# Patient Record
Sex: FEMALE | Race: WHITE | NOT HISPANIC OR LATINO | Employment: STUDENT | URBAN - METROPOLITAN AREA
[De-identification: names, ages, dates, MRNs, and addresses within clinical notes are randomized per-mention and may not be internally consistent; named-entity substitution may affect disease eponyms.]

---

## 2023-04-17 ENCOUNTER — APPOINTMENT (OUTPATIENT)
Dept: PRIMARY CARE | Facility: CLINIC | Age: 23
End: 2023-04-17
Payer: COMMERCIAL

## 2023-04-17 ENCOUNTER — OFFICE VISIT (OUTPATIENT)
Dept: PRIMARY CARE | Facility: CLINIC | Age: 23
End: 2023-04-17
Payer: COMMERCIAL

## 2023-04-17 ENCOUNTER — LAB (OUTPATIENT)
Dept: LAB | Facility: LAB | Age: 23
End: 2023-04-17
Payer: COMMERCIAL

## 2023-04-17 VITALS
DIASTOLIC BLOOD PRESSURE: 74 MMHG | RESPIRATION RATE: 16 BRPM | WEIGHT: 170 LBS | TEMPERATURE: 97.6 F | BODY MASS INDEX: 26.68 KG/M2 | HEART RATE: 70 BPM | HEIGHT: 67 IN | SYSTOLIC BLOOD PRESSURE: 118 MMHG

## 2023-04-17 DIAGNOSIS — Z00.00 ANNUAL PHYSICAL EXAM: ICD-10-CM

## 2023-04-17 DIAGNOSIS — Z00.00 ANNUAL PHYSICAL EXAM: Primary | ICD-10-CM

## 2023-04-17 DIAGNOSIS — K90.41 GLUTEN INTOLERANCE: ICD-10-CM

## 2023-04-17 DIAGNOSIS — E55.9 VITAMIN D INSUFFICIENCY: ICD-10-CM

## 2023-04-17 LAB
ALANINE AMINOTRANSFERASE (SGPT) (U/L) IN SER/PLAS: 15 U/L (ref 7–45)
ALBUMIN (G/DL) IN SER/PLAS: 3.9 G/DL (ref 3.4–5)
ALKALINE PHOSPHATASE (U/L) IN SER/PLAS: 47 U/L (ref 33–110)
ANION GAP IN SER/PLAS: 8 MMOL/L (ref 10–20)
ASPARTATE AMINOTRANSFERASE (SGOT) (U/L) IN SER/PLAS: 16 U/L (ref 9–39)
BASOPHILS (10*3/UL) IN BLOOD BY AUTOMATED COUNT: 0.04 X10E9/L (ref 0–0.1)
BASOPHILS/100 LEUKOCYTES IN BLOOD BY AUTOMATED COUNT: 0.7 % (ref 0–2)
BILIRUBIN TOTAL (MG/DL) IN SER/PLAS: 0.4 MG/DL (ref 0–1.2)
CALCIDIOL (25 OH VITAMIN D3) (NG/ML) IN SER/PLAS: 39 NG/ML
CALCIUM (MG/DL) IN SER/PLAS: 9.2 MG/DL (ref 8.6–10.3)
CARBON DIOXIDE, TOTAL (MMOL/L) IN SER/PLAS: 26 MMOL/L (ref 21–32)
CHLORIDE (MMOL/L) IN SER/PLAS: 105 MMOL/L (ref 98–107)
CHOLESTEROL (MG/DL) IN SER/PLAS: 149 MG/DL (ref 0–199)
CHOLESTEROL IN HDL (MG/DL) IN SER/PLAS: 59.1 MG/DL
CHOLESTEROL/HDL RATIO: 2.5
CREATININE (MG/DL) IN SER/PLAS: 0.77 MG/DL (ref 0.5–1.05)
EOSINOPHILS (10*3/UL) IN BLOOD BY AUTOMATED COUNT: 0.08 X10E9/L (ref 0–0.7)
EOSINOPHILS/100 LEUKOCYTES IN BLOOD BY AUTOMATED COUNT: 1.4 % (ref 0–6)
ERYTHROCYTE DISTRIBUTION WIDTH (RATIO) BY AUTOMATED COUNT: 12.9 % (ref 11.5–14.5)
ERYTHROCYTE MEAN CORPUSCULAR HEMOGLOBIN CONCENTRATION (G/DL) BY AUTOMATED: 32.4 G/DL (ref 32–36)
ERYTHROCYTE MEAN CORPUSCULAR VOLUME (FL) BY AUTOMATED COUNT: 88 FL (ref 80–100)
ERYTHROCYTES (10*6/UL) IN BLOOD BY AUTOMATED COUNT: 4.64 X10E12/L (ref 4–5.2)
GFR FEMALE: >90 ML/MIN/1.73M2
GLUCOSE (MG/DL) IN SER/PLAS: 84 MG/DL (ref 74–99)
HEMATOCRIT (%) IN BLOOD BY AUTOMATED COUNT: 41 % (ref 36–46)
HEMOGLOBIN (G/DL) IN BLOOD: 13.3 G/DL (ref 12–16)
IMMATURE GRANULOCYTES/100 LEUKOCYTES IN BLOOD BY AUTOMATED COUNT: 0.9 % (ref 0–0.9)
LDL: 68 MG/DL (ref 0–119)
LEUKOCYTES (10*3/UL) IN BLOOD BY AUTOMATED COUNT: 5.5 X10E9/L (ref 4.4–11.3)
LYMPHOCYTES (10*3/UL) IN BLOOD BY AUTOMATED COUNT: 1.72 X10E9/L (ref 1.2–4.8)
LYMPHOCYTES/100 LEUKOCYTES IN BLOOD BY AUTOMATED COUNT: 31.2 % (ref 13–44)
MONOCYTES (10*3/UL) IN BLOOD BY AUTOMATED COUNT: 0.37 X10E9/L (ref 0.1–1)
MONOCYTES/100 LEUKOCYTES IN BLOOD BY AUTOMATED COUNT: 6.7 % (ref 2–10)
NEUTROPHILS (10*3/UL) IN BLOOD BY AUTOMATED COUNT: 3.26 X10E9/L (ref 1.2–7.7)
NEUTROPHILS/100 LEUKOCYTES IN BLOOD BY AUTOMATED COUNT: 59.1 % (ref 40–80)
PLATELETS (10*3/UL) IN BLOOD AUTOMATED COUNT: 242 X10E9/L (ref 150–450)
POTASSIUM (MMOL/L) IN SER/PLAS: 4.1 MMOL/L (ref 3.5–5.3)
PROTEIN TOTAL: 6.6 G/DL (ref 6.4–8.2)
SODIUM (MMOL/L) IN SER/PLAS: 135 MMOL/L (ref 136–145)
TRIGLYCERIDE (MG/DL) IN SER/PLAS: 112 MG/DL (ref 0–149)
UREA NITROGEN (MG/DL) IN SER/PLAS: 7 MG/DL (ref 6–23)
VLDL: 22 MG/DL (ref 0–40)

## 2023-04-17 PROCEDURE — 80053 COMPREHEN METABOLIC PANEL: CPT

## 2023-04-17 PROCEDURE — 36415 COLL VENOUS BLD VENIPUNCTURE: CPT

## 2023-04-17 PROCEDURE — 99395 PREV VISIT EST AGE 18-39: CPT | Performed by: NURSE PRACTITIONER

## 2023-04-17 PROCEDURE — 82306 VITAMIN D 25 HYDROXY: CPT

## 2023-04-17 PROCEDURE — 80061 LIPID PANEL: CPT

## 2023-04-17 PROCEDURE — 1036F TOBACCO NON-USER: CPT | Performed by: NURSE PRACTITIONER

## 2023-04-17 PROCEDURE — 85025 COMPLETE CBC W/AUTO DIFF WBC: CPT

## 2023-04-17 RX ORDER — BUSPIRONE HYDROCHLORIDE 5 MG/1
5 TABLET ORAL
COMMUNITY
Start: 2021-03-29 | End: 2024-06-03 | Stop reason: WASHOUT

## 2023-04-17 RX ORDER — NORGESTIMATE AND ETHINYL ESTRADIOL 0.25-0.035
1 KIT ORAL DAILY
COMMUNITY
Start: 2020-03-03

## 2023-04-17 RX ORDER — SUMATRIPTAN 50 MG/1
50 TABLET, FILM COATED ORAL ONCE AS NEEDED
COMMUNITY
Start: 2021-06-03 | End: 2024-06-03 | Stop reason: WASHOUT

## 2023-04-17 RX ORDER — ESCITALOPRAM OXALATE 20 MG/1
1 TABLET ORAL DAILY
COMMUNITY
Start: 2020-12-14 | End: 2023-08-28

## 2023-04-17 RX ORDER — HYDROXYZINE PAMOATE 25 MG/1
25 CAPSULE ORAL
COMMUNITY
Start: 2020-09-02 | End: 2024-06-03 | Stop reason: WASHOUT

## 2023-04-17 RX ORDER — BUPROPION HYDROCHLORIDE 150 MG/1
1 TABLET ORAL DAILY
COMMUNITY
End: 2023-08-28

## 2023-04-17 RX ORDER — ONDANSETRON 4 MG/1
4 TABLET, FILM COATED ORAL EVERY 8 HOURS PRN
Qty: 14 TABLET | Refills: 1 | Status: SHIPPED | OUTPATIENT
Start: 2023-04-17

## 2023-04-17 NOTE — PROGRESS NOTES
"Subjective   Patient ID: Trini Cadena is a 23 y.o. female who presents for Med Refill (Patient here for med refills and possible BW. Last appointment was a year ago. ).    Doing well overall  Appointment for eye exam next month, just saw dentist  Upcoming gyn visit - no concerns  Mood is well managed with current regimen.  Headaches well managed with current regimen.  Tries to keep fluids replenished         Review of Systems   Constitutional: Negative.    HENT: Negative.     Respiratory:  Negative for cough.    Cardiovascular: Negative.    Gastrointestinal:  Negative for abdominal pain and blood in stool.   Genitourinary: Negative.    Musculoskeletal:  Negative for arthralgias and myalgias.   Psychiatric/Behavioral: Negative.         Objective   /74   Pulse 70   Temp 36.4 °C (97.6 °F)   Resp 16   Ht 1.702 m (5' 7\")   Wt 77.1 kg (170 lb)   BMI 26.63 kg/m²     Physical Exam  Vitals reviewed.   Constitutional:       Appearance: Normal appearance. She is not ill-appearing.   HENT:      Head: Normocephalic and atraumatic.   Eyes:      Extraocular Movements: Extraocular movements intact.   Cardiovascular:      Rate and Rhythm: Normal rate and regular rhythm.   Abdominal:      General: Abdomen is flat.      Palpations: Abdomen is soft. There is no mass.      Tenderness: There is no abdominal tenderness. There is no right CVA tenderness or left CVA tenderness.      Comments: Intermittent GI issues, not today - bloating and intolerance likely of gluten.   Typically sx are managed with avoidance.   Musculoskeletal:         General: Normal range of motion.      Cervical back: Normal range of motion. No tenderness.   Skin:     General: Skin is warm and dry.      Capillary Refill: Capillary refill takes less than 2 seconds.   Neurological:      General: No focal deficit present.      Mental Status: She is alert.   Psychiatric:         Mood and Affect: Mood normal.         Assessment/Plan   Diagnoses and all " orders for this visit:  Annual physical exam  Vitamin D insufficiency  Gluten intolerance

## 2023-04-19 ENCOUNTER — TELEPHONE (OUTPATIENT)
Dept: PRIMARY CARE | Facility: CLINIC | Age: 23
End: 2023-04-19
Payer: COMMERCIAL

## 2023-04-19 NOTE — TELEPHONE ENCOUNTER
----- Message from JAYJAY Acosta sent at 4/19/2023  9:11 AM EDT -----  Regarding: labs  Please let Trini know labs are really unremarkable other than vitamin d is low as we discussed.  Ideal vitamin D is 50-60, so add 1-2000 international units daily of OTC vitamin D3.  Cholesterols are in good balance.     Encourage her to sign up for [employer] so I can send messages directly.       ----- Message -----  From: Lab, Background User  Sent: 4/17/2023   3:31 PM EDT  To: JAYJAY Acosta

## 2023-04-27 ENCOUNTER — TELEPHONE (OUTPATIENT)
Dept: PRIMARY CARE | Facility: CLINIC | Age: 23
End: 2023-04-27
Payer: COMMERCIAL

## 2023-04-27 NOTE — TELEPHONE ENCOUNTER
----- Message from Gretta Cazares CMA sent at 4/19/2023 10:40 AM EDT -----  Regarding: FW: labs  Unable to leave a message at this time   ----- Message -----  From: JAYJAY Acosta  Sent: 4/19/2023   9:14 AM EDT  To: Gretta Cazares CMA  Subject: labs                                             Please let Trini know labs are really unremarkable other than vitamin d is low as we discussed.  Ideal vitamin D is 50-60, so add 1-2000 international units daily of OTC vitamin D3.  Cholesterols are in good balance.     Encourage her to sign up for [employer] so I can send messages directly.       ----- Message -----  From: Lab, Background User  Sent: 4/17/2023   3:31 PM EDT  To: JAYJAY Acosta

## 2023-05-30 ASSESSMENT — ENCOUNTER SYMPTOMS
ARTHRALGIAS: 0
ABDOMINAL PAIN: 0
BLOOD IN STOOL: 0
PSYCHIATRIC NEGATIVE: 1
COUGH: 0
CONSTITUTIONAL NEGATIVE: 1
MYALGIAS: 0
CARDIOVASCULAR NEGATIVE: 1

## 2023-05-30 NOTE — PATIENT INSTRUCTIONS
Will send some Zofran for nausea associated w/ H/A , will report changes in pattern or symptoms.  Recommend a high gluten diet prior to testing for celiac disease to optimize results, defers while in school.  Routine labs.  Maintain healthy balances with school etc.  Follow up PRN and 6 months.  Form completed for school

## 2023-08-28 DIAGNOSIS — F41.9 ANXIETY AND DEPRESSION: Primary | ICD-10-CM

## 2023-08-28 DIAGNOSIS — F32.A ANXIETY AND DEPRESSION: Primary | ICD-10-CM

## 2023-08-28 RX ORDER — ESCITALOPRAM OXALATE 20 MG/1
20 TABLET ORAL DAILY
Qty: 90 TABLET | Refills: 3 | Status: SHIPPED | OUTPATIENT
Start: 2023-08-28

## 2023-08-28 RX ORDER — BUPROPION HYDROCHLORIDE 150 MG/1
150 TABLET ORAL DAILY
Qty: 90 TABLET | Refills: 3 | Status: SHIPPED | OUTPATIENT
Start: 2023-08-28

## 2024-05-29 ENCOUNTER — OFFICE VISIT (OUTPATIENT)
Dept: ORTHOPEDIC SURGERY | Facility: CLINIC | Age: 24
End: 2024-05-29
Payer: COMMERCIAL

## 2024-05-29 DIAGNOSIS — M65.4 DE QUERVAIN'S TENOSYNOVITIS, RIGHT: Primary | ICD-10-CM

## 2024-05-29 PROCEDURE — 99213 OFFICE O/P EST LOW 20 MIN: CPT | Performed by: ORTHOPAEDIC SURGERY

## 2024-05-29 PROCEDURE — 20550 NJX 1 TENDON SHEATH/LIGAMENT: CPT | Performed by: ORTHOPAEDIC SURGERY

## 2024-05-29 PROCEDURE — 2500000004 HC RX 250 GENERAL PHARMACY W/ HCPCS (ALT 636 FOR OP/ED): Performed by: ORTHOPAEDIC SURGERY

## 2024-05-29 PROCEDURE — 2500000005 HC RX 250 GENERAL PHARMACY W/O HCPCS: Performed by: ORTHOPAEDIC SURGERY

## 2024-05-29 PROCEDURE — 1036F TOBACCO NON-USER: CPT | Performed by: ORTHOPAEDIC SURGERY

## 2024-05-29 RX ADMIN — TRIAMCINOLONE ACETONIDE 20 MG: 40 INJECTION, SUSPENSION INTRA-ARTICULAR; INTRAMUSCULAR at 17:42

## 2024-05-29 RX ADMIN — LIDOCAINE HYDROCHLORIDE 0.5 ML: 10 INJECTION, SOLUTION INFILTRATION; PERINEURAL at 17:42

## 2024-05-29 NOTE — LETTER
Sarah 3, 2024       No Recipients    Patient: Trini Cadena   YOB: 2000   Date of Visit: 5/29/2024       Dear Dr. Cobb Recipients:    Thank you for referring Trini Cadena to me for evaluation. Below are my notes for this consultation.  If you have questions, please do not hesitate to call me. I look forward to following your patient along with you.       Sincerely,     Cb Diaz MD      CC:   No Recipients  ______________________________________________________________________________________    CHIEF COMPLAINT         Right wrist pain    ASSESSMENT + PLAN    Right de Quervain's tendinitis, persistent despite splinting    The nature of de Quervain's tendinitis was reviewed, along with the natural history.  I reviewed the options for management, including observation, nonsteroidals, splinting, oral steroids, cortisone injection, or surgical release, along with the likely success rates of each.  I reviewed the risks of cortisone injection, including infection, hypopigmentation, and fat atrophy.  The transient effect on blood glucose measurement was also reviewed, and the patient wished to proceed.    After sterile prep, I injected 20 mg of Kenalog and 0.5 cc of 1% lidocaine, plain to the right first dorsal compartment.    Patient will followup in 4 weeks if still symptomatic, or with any concerns, and will continue with the brace and nonsteroidals as needed.        HISTORY OF PRESENT ILLNESS       Patient returns today, almost 2 years after last visit with me.  Her left de Quervain's tendinitis resolved with the previous cortisone injection.  She is now here with similar symptoms on the right that began a couple of months ago when she got a new puppy.  No single specific recalled direct trauma to the right wrist.  Pain is minimal at baseline but becomes quite sharp and focal with pinching or ulnar deviation motion.  No popping, clicking, or instability.  This reminds her of her previous  de Quervain's on the left.  She did get a splint for this side but it is not helping much.  She is here requesting an injection.      PHYSICAL EXAM       She remains well-developed, well-nourished female in no acute distress.  She appears her stated age and has a pleasant affect.  Skin of the right hand and wrist remains intact with no erythema, ecchymosis, or diffuse swelling.  Normal skin drag and coloration.  Full composite finger flexion extension with good intrinsic plus and minus posture.  No triggering.  Thumb opposes to station 9.  Mild swelling at the first dorsal compartment and rather tender to palpation there.  Positive Finkelstein, reproducing chief complaint.  Positive wrist flexion-thumb extension test.  Negative Valencia.  Negative midcarpal shift.  Sensation intact to light touch in all distributions.  Capillary refill less than 2 seconds.  2+ radial and ulnar pulses.      IMAGING / LABS / EMGs        None today      PROCEDURE    Hand / UE Inj/Asp: R extensor compartment 1 for de Quervain's tenosynovitis on 5/29/2024 5:42 PM  Indications: therapeutic  Details: 25 G needle, radial approach  Medications: 20 mg triamcinolone acetonide 40 mg/mL; 0.5 mL lidocaine 10 mg/mL (1 %)  Outcome: tolerated well, no immediate complications  Procedure, treatment alternatives, risks and benefits explained, specific risks discussed. Consent was given by the patient.             Electronically Signed      MIKAYLA Diaz MD      Orthopaedic Hand Surgery      [phone]

## 2024-06-03 PROBLEM — M65.4 DE QUERVAIN'S TENOSYNOVITIS, RIGHT: Status: ACTIVE | Noted: 2024-06-03

## 2024-06-03 RX ORDER — TRIAMCINOLONE ACETONIDE 40 MG/ML
20 INJECTION, SUSPENSION INTRA-ARTICULAR; INTRAMUSCULAR
Status: COMPLETED | OUTPATIENT
Start: 2024-05-29 | End: 2024-05-29

## 2024-06-03 RX ORDER — LIDOCAINE HYDROCHLORIDE 10 MG/ML
0.5 INJECTION INFILTRATION; PERINEURAL
Status: COMPLETED | OUTPATIENT
Start: 2024-05-29 | End: 2024-05-29

## 2024-06-03 NOTE — PROGRESS NOTES
CHIEF COMPLAINT         Right wrist pain    ASSESSMENT + PLAN    Right de Quervain's tendinitis, persistent despite splinting    The nature of de Quervain's tendinitis was reviewed, along with the natural history.  I reviewed the options for management, including observation, nonsteroidals, splinting, oral steroids, cortisone injection, or surgical release, along with the likely success rates of each.  I reviewed the risks of cortisone injection, including infection, hypopigmentation, and fat atrophy.  The transient effect on blood glucose measurement was also reviewed, and the patient wished to proceed.    After sterile prep, I injected 20 mg of Kenalog and 0.5 cc of 1% lidocaine, plain to the right first dorsal compartment.    Patient will followup in 4 weeks if still symptomatic, or with any concerns, and will continue with the brace and nonsteroidals as needed.        HISTORY OF PRESENT ILLNESS       Patient returns today, almost 2 years after last visit with me.  Her left de Quervain's tendinitis resolved with the previous cortisone injection.  She is now here with similar symptoms on the right that began a couple of months ago when she got a new puppy.  No single specific recalled direct trauma to the right wrist.  Pain is minimal at baseline but becomes quite sharp and focal with pinching or ulnar deviation motion.  No popping, clicking, or instability.  This reminds her of her previous de Quervain's on the left.  She did get a splint for this side but it is not helping much.  She is here requesting an injection.      PHYSICAL EXAM       She remains well-developed, well-nourished female in no acute distress.  She appears her stated age and has a pleasant affect.  Skin of the right hand and wrist remains intact with no erythema, ecchymosis, or diffuse swelling.  Normal skin drag and coloration.  Full composite finger flexion extension with good intrinsic plus and minus posture.  No triggering.  Thumb opposes  to station 9.  Mild swelling at the first dorsal compartment and rather tender to palpation there.  Positive Finkelstein, reproducing chief complaint.  Positive wrist flexion-thumb extension test.  Negative Valencia.  Negative midcarpal shift.  Sensation intact to light touch in all distributions.  Capillary refill less than 2 seconds.  2+ radial and ulnar pulses.      IMAGING / LABS / EMGs        None today      PROCEDURE    Hand / UE Inj/Asp: R extensor compartment 1 for de Quervain's tenosynovitis on 5/29/2024 5:42 PM  Indications: therapeutic  Details: 25 G needle, radial approach  Medications: 20 mg triamcinolone acetonide 40 mg/mL; 0.5 mL lidocaine 10 mg/mL (1 %)  Outcome: tolerated well, no immediate complications  Procedure, treatment alternatives, risks and benefits explained, specific risks discussed. Consent was given by the patient.             Electronically Signed      MIKAYLA Diaz MD      Orthopaedic Hand Surgery      [phone]

## 2025-02-25 ENCOUNTER — OFFICE VISIT (OUTPATIENT)
Dept: ORTHOPEDIC SURGERY | Facility: CLINIC | Age: 25
End: 2025-02-25
Payer: COMMERCIAL

## 2025-02-25 DIAGNOSIS — M65.4 DE QUERVAIN'S TENOSYNOVITIS, RIGHT: Primary | ICD-10-CM

## 2025-02-25 PROCEDURE — 2500000004 HC RX 250 GENERAL PHARMACY W/ HCPCS (ALT 636 FOR OP/ED): Performed by: ORTHOPAEDIC SURGERY

## 2025-02-25 PROCEDURE — 20550 NJX 1 TENDON SHEATH/LIGAMENT: CPT | Mod: RT | Performed by: ORTHOPAEDIC SURGERY

## 2025-02-25 PROCEDURE — 99213 OFFICE O/P EST LOW 20 MIN: CPT | Mod: 25 | Performed by: ORTHOPAEDIC SURGERY

## 2025-02-25 RX ADMIN — TRIAMCINOLONE ACETONIDE 20 MG: 40 INJECTION, SUSPENSION INTRA-ARTICULAR; INTRAMUSCULAR at 20:29

## 2025-02-25 RX ADMIN — LIDOCAINE HYDROCHLORIDE 0.5 ML: 10 INJECTION, SOLUTION INFILTRATION; PERINEURAL at 20:29

## 2025-02-25 NOTE — LETTER
March 1, 2025     No Recipients    Patient: Trini Cadena   YOB: 2000   Date of Visit: 2/25/2025       Dear Dr. Cobb Recipients:    Thank you for referring Trini Cadena to me for evaluation. Below are my notes for this consultation.  If you have questions, please do not hesitate to call me. I look forward to following your patient along with you.       Sincerely,     Cb Diaz MD      CC: No Recipients  ______________________________________________________________________________________    CHIEF COMPLAINT         Right wrist pain    ASSESSMENT + PLAN    Right de Quervain's tendinitis, recurrent after remote injection    The nature of de Quervain’s tendinitis was reviewed, along with the natural history.  I reviewed the options for management, including observation, nonsteroidals, splinting, oral steroids, cortisone injection, or surgical release, along with the likely success rates of each.  I reviewed the risks of cortisone injection, including infection, hypopigmentation, and fat atrophy.  The transient effect on blood glucose measurement was also reviewed, and the patient wished to proceed.     After sterile prep, I injected 20 mg of Kenalog and 0.5 cc of 1% lidocaine, plain to the right first dorsal compartment.     Patient will followup in 4 weeks if still symptomatic, or with any concerns, and will continue with the brace and nonsteroidals as needed.        HISTORY OF PRESENT ILLNESS       Patient returns today, 10 months after last visit with me with recurrent pain of the right radial styloid from known de Quervain's tenosynovitis.  Her left side remains asymptomatic after a remote injection.  No interval trauma.  No numbness or tingling.  She is using her splint but is having breakthrough symptoms.  She is requesting another injection today.      PHYSICAL EXAM       She remains well-developed, well-nourished female in no acute distress.  She appears her stated age and has a  pleasant affect.  Skin of the right hand and wrist remains intact with no erythema, ecchymosis, diffuse swelling, or cortisone stigmata.  Mild discomfort with palpation at the first dorsal compartment.  No triggering or CMC signs.  Positive Finkelstein, reproducing chief complaint.  Sensation intact to light touch in all distributions.  Capillary refill less than 2 seconds.  Negative Valencia.  Negative midcarpal shift.      IMAGING / LABS / EMGs    None today      PROCEDURE    Hand / UE Inj/Asp: R extensor compartment 1 for de Quervain's tenosynovitis on 2/25/2025 8:29 PM  Indications: therapeutic  Details: 25 G needle, radial approach  Medications: 20 mg triamcinolone acetonide 40 mg/mL; 0.5 mL lidocaine 10 mg/mL (1 %)  Outcome: tolerated well, no immediate complications  Procedure, treatment alternatives, risks and benefits explained, specific risks discussed. Consent was given by the patient.             Electronically Signed      MIKAYLA Diaz MD      Orthopaedic Hand Surgery      [phone]

## 2025-02-25 NOTE — PROGRESS NOTES
CHIEF COMPLAINT         Right wrist pain    ASSESSMENT + PLAN    Right de Quervain's tendinitis, recurrent after remote injection    The nature of de Quervain’s tendinitis was reviewed, along with the natural history.  I reviewed the options for management, including observation, nonsteroidals, splinting, oral steroids, cortisone injection, or surgical release, along with the likely success rates of each.  I reviewed the risks of cortisone injection, including infection, hypopigmentation, and fat atrophy.  The transient effect on blood glucose measurement was also reviewed, and the patient wished to proceed.     After sterile prep, I injected 20 mg of Kenalog and 0.5 cc of 1% lidocaine, plain to the right first dorsal compartment.     Patient will followup in 4 weeks if still symptomatic, or with any concerns, and will continue with the brace and nonsteroidals as needed.        HISTORY OF PRESENT ILLNESS       Patient returns today, 10 months after last visit with me with recurrent pain of the right radial styloid from known de Quervain's tenosynovitis.  Her left side remains asymptomatic after a remote injection.  No interval trauma.  No numbness or tingling.  She is using her splint but is having breakthrough symptoms.  She is requesting another injection today.      PHYSICAL EXAM       She remains well-developed, well-nourished female in no acute distress.  She appears her stated age and has a pleasant affect.  Skin of the right hand and wrist remains intact with no erythema, ecchymosis, diffuse swelling, or cortisone stigmata.  Mild discomfort with palpation at the first dorsal compartment.  No triggering or CMC signs.  Positive Finkelstein, reproducing chief complaint.  Sensation intact to light touch in all distributions.  Capillary refill less than 2 seconds.  Negative Valencia.  Negative midcarpal shift.      IMAGING / LABS / EMGs    None today      PROCEDURE    Hand / UE Inj/Asp: R extensor compartment 1 for  de Quervain's tenosynovitis on 2/25/2025 8:29 PM  Indications: therapeutic  Details: 25 G needle, radial approach  Medications: 20 mg triamcinolone acetonide 40 mg/mL; 0.5 mL lidocaine 10 mg/mL (1 %)  Outcome: tolerated well, no immediate complications  Procedure, treatment alternatives, risks and benefits explained, specific risks discussed. Consent was given by the patient.             Electronically Signed      MIKAYLA Diaz MD      Orthopaedic Hand Surgery      [phone]

## 2025-03-01 RX ORDER — LIDOCAINE HYDROCHLORIDE 10 MG/ML
0.5 INJECTION, SOLUTION INFILTRATION; PERINEURAL
Status: COMPLETED | OUTPATIENT
Start: 2025-02-25 | End: 2025-02-25

## 2025-03-01 RX ORDER — TRIAMCINOLONE ACETONIDE 40 MG/ML
20 INJECTION, SUSPENSION INTRA-ARTICULAR; INTRAMUSCULAR
Status: COMPLETED | OUTPATIENT
Start: 2025-02-25 | End: 2025-02-25